# Patient Record
Sex: FEMALE | Race: WHITE | NOT HISPANIC OR LATINO | Employment: UNEMPLOYED | URBAN - METROPOLITAN AREA
[De-identification: names, ages, dates, MRNs, and addresses within clinical notes are randomized per-mention and may not be internally consistent; named-entity substitution may affect disease eponyms.]

---

## 2024-07-11 ENCOUNTER — OFFICE VISIT (OUTPATIENT)
Dept: PRIMARY CARE | Facility: CLINIC | Age: 44
End: 2024-07-11
Payer: COMMERCIAL

## 2024-07-11 ENCOUNTER — HOSPITAL ENCOUNTER (OUTPATIENT)
Dept: RADIOLOGY | Facility: CLINIC | Age: 44
Discharge: HOME | End: 2024-07-11
Payer: COMMERCIAL

## 2024-07-11 VITALS
HEART RATE: 103 BPM | DIASTOLIC BLOOD PRESSURE: 76 MMHG | TEMPERATURE: 98 F | WEIGHT: 185 LBS | RESPIRATION RATE: 18 BRPM | SYSTOLIC BLOOD PRESSURE: 122 MMHG | OXYGEN SATURATION: 98 %

## 2024-07-11 DIAGNOSIS — M25.551 ACUTE RIGHT HIP PAIN: ICD-10-CM

## 2024-07-11 DIAGNOSIS — M25.551 ACUTE RIGHT HIP PAIN: Primary | ICD-10-CM

## 2024-07-11 DIAGNOSIS — M62.838 MUSCLE SPASM: ICD-10-CM

## 2024-07-11 PROCEDURE — 73502 X-RAY EXAM HIP UNI 2-3 VIEWS: CPT | Mod: RT

## 2024-07-11 PROCEDURE — 99213 OFFICE O/P EST LOW 20 MIN: CPT | Performed by: NURSE PRACTITIONER

## 2024-07-11 PROCEDURE — 1036F TOBACCO NON-USER: CPT | Performed by: NURSE PRACTITIONER

## 2024-07-11 PROCEDURE — 73502 X-RAY EXAM HIP UNI 2-3 VIEWS: CPT | Mod: RIGHT SIDE | Performed by: RADIOLOGY

## 2024-07-11 RX ORDER — CYCLOBENZAPRINE HCL 10 MG
10 TABLET ORAL NIGHTLY PRN
Qty: 30 TABLET | Refills: 0 | Status: SHIPPED | OUTPATIENT
Start: 2024-07-11 | End: 2024-09-09

## 2024-07-11 RX ORDER — SERTRALINE HYDROCHLORIDE 100 MG/1
TABLET, FILM COATED ORAL
COMMUNITY
Start: 2021-09-03

## 2024-07-11 RX ORDER — LEVONORGESTREL 52 MG/1
1 INTRAUTERINE DEVICE INTRAUTERINE
COMMUNITY

## 2024-07-11 RX ORDER — PREDNISONE 20 MG/1
TABLET ORAL
Qty: 18 TABLET | Refills: 0 | Status: SHIPPED | OUTPATIENT
Start: 2024-07-11 | End: 2024-07-20

## 2024-07-11 RX ORDER — DEXTROAMPHETAMINE SACCHARATE, AMPHETAMINE ASPARTATE, DEXTROAMPHETAMINE SULFATE AND AMPHETAMINE SULFATE 5; 5; 5; 5 MG/1; MG/1; MG/1; MG/1
20 TABLET ORAL DAILY
COMMUNITY

## 2024-07-11 RX ORDER — ARIPIPRAZOLE 5 MG/1
TABLET ORAL
COMMUNITY

## 2024-07-11 ASSESSMENT — ENCOUNTER SYMPTOMS
FEVER: 0
HEADACHES: 0
LOSS OF SENSATION: 0
FATIGUE: 0
CHILLS: 0
TINGLING: 1
DIARRHEA: 0
CONSTIPATION: 0
ARTHRALGIAS: 1
SHORTNESS OF BREATH: 0
MYALGIAS: 1
NUMBNESS: 1
DYSURIA: 0
NERVOUS/ANXIOUS: 0
SORE THROAT: 0
HIP PAIN: 1
RHINORRHEA: 0
LOSS OF MOTION: 0

## 2024-07-11 NOTE — PROGRESS NOTES
Subjective   Patient ID: Deidra Caatlan is a 43 y.o. female who presents for Hip Pain.    She has a burning in the front of her thigh, and today when she woke up she had more pain in the back and side of her hip.    She has been taking ibuprofen 600mg twice a day which barely takes the edge off.     She had an MRI of her hips about 4 years ago, which was normal.    Hip Pain   Incident onset: 1 month. There was no injury mechanism. The pain is present in the right hip and right thigh. The quality of the pain is described as burning. Associated symptoms include numbness and tingling. Pertinent negatives include no loss of motion or loss of sensation. The symptoms are aggravated by movement, weight bearing and palpation. She has tried NSAIDs, non-weight bearing, rest and elevation for the symptoms. The treatment provided mild relief.        Review of Systems   Constitutional:  Negative for chills, fatigue and fever.   HENT:  Negative for rhinorrhea and sore throat.    Respiratory:  Negative for shortness of breath.    Cardiovascular:  Negative for chest pain.   Gastrointestinal:  Negative for constipation and diarrhea.   Genitourinary:  Negative for dysuria.   Musculoskeletal:  Positive for arthralgias and myalgias.   Neurological:  Positive for tingling and numbness. Negative for headaches.   Psychiatric/Behavioral:  The patient is not nervous/anxious.        Objective   /76   Pulse 103   Temp 36.7 °C (98 °F)   Resp 18   Wt 83.9 kg (185 lb)   SpO2 98%     Physical Exam  Constitutional:       Appearance: Normal appearance.   Cardiovascular:      Rate and Rhythm: Normal rate and regular rhythm.      Pulses: Normal pulses.      Heart sounds: Normal heart sounds.   Pulmonary:      Effort: Pulmonary effort is normal.      Breath sounds: Normal breath sounds.   Abdominal:      General: Bowel sounds are normal.      Palpations: Abdomen is soft.   Musculoskeletal:         General: Normal range of motion.       Right hip: Tenderness and bony tenderness present. Normal range of motion. Normal strength.      Left hip: Normal.        Legs:    Skin:     General: Skin is warm and dry.   Neurological:      General: No focal deficit present.      Mental Status: She is alert.   Psychiatric:         Mood and Affect: Mood normal.         Behavior: Behavior normal.         Assessment/Plan   Problem List Items Addressed This Visit    None  Visit Diagnoses       Acute right hip pain    -  Primary    Relevant Medications    predniSONE (Deltasone) 20 mg tablet    cyclobenzaprine (Flexeril) 10 mg tablet    Other Relevant Orders    XR hip right with pelvis when performed 2 or 3 views    Referral to Orthopaedic Surgery    Muscle spasm        Relevant Orders    Referral to Orthopaedic Surgery          Patient Instructions   Patient encouraged to have xray done today, and we will call with results when available. Start taking prednisone daily, flexeril and tylenol as needed. Encouraged to follow-up with ortho or PCP if no improvement by Monday. Call the office if any problems or concerns in the meantime.

## 2024-07-12 NOTE — PATIENT INSTRUCTIONS
Patient encouraged to have xray done today, and we will call with results when available. Start taking prednisone daily, flexeril and tylenol as needed. Encouraged to follow-up with ortho or PCP if no improvement by Monday. Call the office if any problems or concerns in the meantime.

## 2024-07-24 ENCOUNTER — OFFICE VISIT (OUTPATIENT)
Dept: ORTHOPEDIC SURGERY | Facility: CLINIC | Age: 44
End: 2024-07-24
Payer: COMMERCIAL

## 2024-07-24 DIAGNOSIS — M25.551 ACUTE RIGHT HIP PAIN: ICD-10-CM

## 2024-07-24 DIAGNOSIS — M32.9 LUPUS ARTHRITIS (MULTI): ICD-10-CM

## 2024-07-24 DIAGNOSIS — M25.859 FEMORAL ACETABULAR IMPINGEMENT: ICD-10-CM

## 2024-07-24 DIAGNOSIS — M62.838 MUSCLE SPASM: ICD-10-CM

## 2024-07-24 PROCEDURE — 99213 OFFICE O/P EST LOW 20 MIN: CPT | Performed by: STUDENT IN AN ORGANIZED HEALTH CARE EDUCATION/TRAINING PROGRAM

## 2024-07-24 PROCEDURE — 99203 OFFICE O/P NEW LOW 30 MIN: CPT | Performed by: STUDENT IN AN ORGANIZED HEALTH CARE EDUCATION/TRAINING PROGRAM

## 2024-07-24 RX ORDER — METHYLPREDNISOLONE 4 MG/1
TABLET ORAL
Qty: 21 TABLET | Refills: 0 | Status: SHIPPED | OUTPATIENT
Start: 2024-07-24

## 2024-07-24 NOTE — PROGRESS NOTES
No chief complaint on file.      HPI  43-year-old female presents today for evaluation of her right hip.  States that she has been having some right sharp hip pain with no recent history of trauma.  Also recently had a colitis type flare.  States that the pain is sharp worse with movement and relieved with rest.  States the pain begins about the anterior aspect of the hip and does radiate to the groin.  Denies any numbness or tingling to the lower extremity.  Currently 2 out of 10 on pain scale.    Past Medical History:   Diagnosis Date    Depression, unspecified 12/16/2019    Depression       No past surgical history on file.     No Known Allergies     Physical exam    General: Alert and oriented to place, person, and time.  No acute distress and breathing comfortably; pleasant and cooperative with the examination.  HEENT: Head is normocephalic and atraumatic.  Neck: Supple, no visible swelling.  Cardiovascular: Good perfusion to the affected extremity.  Lungs: No audible wheezing or labored breathing.  Abdomen: Nondistended  HEME/Lymph : No visible abnormalities bilateral lower extremity    Extremity:  Right Hip:  Normal gait  Negative Trendelenburg sign  Skin healthy and intact  No tenderness to palpation over lumbar spine  No tenderness over greater trochanter    Full forward flexion  Symmetric motion, no loss of internal rotation   No weakness with resisted hip flexion, abduction or adduction    Positive flexion/adduction/internal rotation  Negative flexion/abduction/external rotation test  Negative straight leg raise  Neurovascular exam normal distally     Diagnostics:    XR hip right with pelvis when performed 2 or 3 views    Result Date: 7/12/2024  Interpreted By:  Jesusita Colón, STUDY: Single view pelvis. Right hip, two views.   INDICATION: Signs/Symptoms:hip pain.   COMPARISON: None.   ACCESSION NUMBER(S): [id number]   ORDERING CLINICIAN: SUSU OLEARY   FINDINGS: No acute fracture or  malalignment. Bilateral hip joint spaces are well preserved.   Pubic symphysis degenerative changes with sclerosis and osteophytes. Soft tissues are within normal limits.       1. Unremarkable pelvis and right hip radiographs. 2. Degenerative changes of the pubic symphysis noted   MACRO: None.   Signed by: Jesusita Colón 7/12/2024 7:07 PM Dictation workstation:   [id number]       Procedure:  Procedures    Assessment:  43-year-old female with right hip pain concerning with femoral acetabular impingement    Treatment plan:  The natural history of the condition and its associated treatment alternatives including surgical and nonsurgical options were discussed with the patient at length.  Constellation of findings discussed with patient in detail x-rays regarding the pelvis reviewed this is consistent with osteitis pubis, on history and physical exam patient does have some groin pain that may likely be secondary to labrum tear/femoral acetabular impingement I do think she would benefit from an oral Medrol with Dosepak as well as physical therapy.  I do think that she would also benefit from a rheumatology workup for possible lupus versus other rheumatologic etiology given her recent colitis flare and new onset joint pain.  Will provide a prescription for oral Medrol Dosepak  She will follow-up in 2 months  Discussed activities to avoid as well as importance of using pain as a guide  All of the patient's questions were answered.

## 2024-07-26 ENCOUNTER — TELEPHONE (OUTPATIENT)
Dept: ORTHOPEDIC SURGERY | Facility: CLINIC | Age: 44
End: 2024-07-26

## 2024-07-26 ENCOUNTER — OFFICE VISIT (OUTPATIENT)
Dept: RHEUMATOLOGY | Facility: CLINIC | Age: 44
End: 2024-07-26
Payer: COMMERCIAL

## 2024-07-26 VITALS
HEIGHT: 65 IN | WEIGHT: 175 LBS | HEART RATE: 123 BPM | DIASTOLIC BLOOD PRESSURE: 70 MMHG | TEMPERATURE: 97.8 F | BODY MASS INDEX: 29.16 KG/M2 | SYSTOLIC BLOOD PRESSURE: 103 MMHG

## 2024-07-26 DIAGNOSIS — K52.9 COLITIS: Primary | ICD-10-CM

## 2024-07-26 DIAGNOSIS — M25.859 FEMORAL ACETABULAR IMPINGEMENT: ICD-10-CM

## 2024-07-26 PROCEDURE — 3008F BODY MASS INDEX DOCD: CPT | Performed by: INTERNAL MEDICINE

## 2024-07-26 PROCEDURE — 1036F TOBACCO NON-USER: CPT | Performed by: INTERNAL MEDICINE

## 2024-07-26 PROCEDURE — 99214 OFFICE O/P EST MOD 30 MIN: CPT | Performed by: INTERNAL MEDICINE

## 2024-07-26 RX ORDER — CELECOXIB 200 MG/1
200 CAPSULE ORAL 2 TIMES DAILY PRN
Qty: 60 CAPSULE | Refills: 0 | Status: SHIPPED | OUTPATIENT
Start: 2024-07-26 | End: 2024-08-25

## 2024-07-26 NOTE — PATIENT INSTRUCTIONS
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    Follow up with GI for colitis,  If there is future concerns about IBD related arthritis we will be seeing you then

## 2024-07-26 NOTE — TELEPHONE ENCOUNTER
Patient called stating she was seen by Dr. Luna yesterday, 07/24/24 & that he recommended she see a rheumatologist for possible lupus, which she did see one today & the rheumatologist told her she does not have lupus, but that she has some bone spurs and osteoarthritis in both hips. She would like to know If he would like her to do physical therapy or what does he advise her to do. She states her next appointment to see Dr. Luna isn't until September so she is just curious what she should be doing in the meantime to help.

## 2024-07-26 NOTE — TELEPHONE ENCOUNTER
Spoke with Dr. Luna, he said that he sent over an MDP, to take that, see if it helps with the pain and thy will discuss everything at the next visit.    Called patient, she said that the rheum said that she should stop the MDP and start taking Celebrex.  I told her to keep taking that and then everything will be discussed at her next visit.  Verbalized understanding

## 2024-07-26 NOTE — PROGRESS NOTES
Subjective   Patient ID: [id number]   Deidra Catalan is a 43 y.o. female who presents for Joint Pain.  HPI  New referral for right Hip pain  Seen by ortho recently, advised to rule out ?lupus  Patient has chronic hip pain but has been getting worse for a few weeks  Recently had sudden worsening with bloody diarrhea prompting her to go to ED  CT was performed that showed active inflammatory colitis in the descending colon  Infectious vs inflammatory  For hip pain she was seen by ortho and told she may have autoimmune inflammatory arthritis or lupus?    She has mechanical hip pain bilaterally, but more on the right now, worse with walking, climbing stairs, standing from a sitting position , getting out of a car etc  No clicking or popping sounds  No pain at rest  Denies fever  Denies achilles tendonitis, plantar fascitis, dactylitis  Denies history of chronic back pain, morning stiffness  NO psoriasis  Other joints are fine    Prior history of fatigue and alopecia  With alopecia areata  CTD autoabs were tested then , she was positive for AZALEA low titre  Which was subsequently negative in 2021 along with negative CHERYL   Denies malar rash, RP, oral or genital ulcers,   Denies fevers, weight loss, night sweats  No obstetric hx, no prior VTE phenomenon  No myalgias    Has been having chronic diarrhea intermittently wo blood  But from las week had bloody diarrhea as aforementioned  Non smoker  Has 5 children    ROS  Constitutional: Denies fever, chills, weight loss, night sweats or headaches  Eyes: Denies dry eyes, blurry vision, redness or pain or photophobia  ENT: Denies dry mouth, dental loss, loss of taste, nasal or oral ulcers, jaw claudication, difficulty swallowing, nasal crusting or recurrent sinus infections   Cardiovascular: Denies chest pain, palpitations, orthopnea  Respiratory: Denies shortness of breath, cough, asthma, or recurrent respiratory infections  Gastrointestinal: Denies dysphagia, nausea, vomiting,  heartburn, abdominal pain, constipation, diarrhea, melena or hematochezia  Genitourinary: No recurrent urinary infections or STDs, no genital or anal ulcers.  Integumentary: Denies photosensitivity, rash or lesions, Raynaud's phenomenon, skin tightening, digital ulcers, psoriatic lesions, or alopecia  Neurological: Denies any numbness or tingling, muscle weakness, or incontinence   Hematologic/Lymphatic: Denies bleeding, bruising, history of clots (arterial or venous), or abortions/miscarriages/pregnancy complications   MSK: No joint pains, redness, hotness or swelling. No inflammatory back pain, enthesitis, dactylitis. No morning stiffness   Muscular: Denies weakness, difficulty rising from chair or combing the hair, muscle aches, or problems with hand    FHx: No family history of autoimmune diseases       There is no problem list on file for this patient.       Past Medical History:   Diagnosis Date    Depression, unspecified 12/16/2019    Depression        No past surgical history on file.     Social History     Socioeconomic History    Marital status:      Spouse name: Not on file    Number of children: Not on file    Years of education: Not on file    Highest education level: Not on file   Occupational History    Not on file   Tobacco Use    Smoking status: Former     Types: Cigarettes     Passive exposure: Past    Smokeless tobacco: Never   Substance and Sexual Activity    Alcohol use: Not on file    Drug use: Not on file    Sexual activity: Not on file   Other Topics Concern    Not on file   Social History Narrative    Not on file     Social Determinants of Health     Financial Resource Strain: Not on file   Food Insecurity: Not on file   Transportation Needs: Not on file   Physical Activity: Not on file   Stress: Not on file   Social Connections: Not on file   Intimate Partner Violence: Not on file   Housing Stability: Not on file        No Known Allergies       Current Outpatient Medications:      amphetamine-dextroamphetamine (Adderall) 20 mg tablet, Take 1 tablet (20 mg) by mouth once daily., Disp: , Rfl:     ARIPiprazole (Abilify) 5 mg tablet, Take by mouth., Disp: , Rfl:     celecoxib (CeleBREX) 200 mg capsule, Take 1 capsule (200 mg) by mouth 2 times a day as needed for moderate pain (4 - 6)., Disp: 60 capsule, Rfl: 0    cyclobenzaprine (Flexeril) 10 mg tablet, Take 1 tablet (10 mg) by mouth as needed at bedtime for muscle spasms., Disp: 30 tablet, Rfl: 0    levonorgestrel (Mirena) 21 mcg/24 hr (8 yrs) 52 mg IUD, 52 mg by intrauterine route., Disp: , Rfl:     methylPREDNISolone (Medrol Dospak) 4 mg tablets, Use as directed by package instructions, Disp: 21 tablet, Rfl: 0    sertraline (Zoloft) 100 mg tablet, Take by mouth., Disp: , Rfl:        Objective     Visit Vitals  /70 (BP Location: Left arm, Patient Position: Sitting, BP Cuff Size: Adult)   Pulse (!) 123   Temp 36.6 °C (97.8 °F)        Physical Exam  FADIR positive bilaterally  R>L  No stigmata of rheumatic disease on other joint exam  No enthesitis, plantar fascitis or dactylitis  No Psoriasis rash seen    General: AAOx3, Cooperative  Head: normocephalic, atraumatic  Eyes: EOMI, conjunctiva clear, sclera white, anicteric  Ears: no redness, swelling, tenderness  Nose: no deformity, no crusting   Throat/Mouth: No oral deformities, no cheek swelling, mucosa appear moist, no oral ulcers noted or loss of dentition   Neck/Lymph: FROM, trachea midline  Lungs: chest expansion symmetric. No respiratory distress.   Heart: RRR  Neuro: CN II-XII grossly intact, no focal deficit  Skin: No rashes, ulcers or photosensitive areas  MSK: Upper Extremities:  Hand/Fingers: No erythema, swelling, tenderness or warmth at DIP, PIP, or MCP joints, FROM grossly. Good hand . No nodules. No deformities   Wrists: No erythema, swelling, warmth or tenderness at wrist, FROM grossly  Elbows: No tenderness, swelling, erythema or warmth at elbows, FROM grossly. No  "nodules   Shoulders: No swelling, erythema, tenderness or warmth at shoulders. FROM  Lower Extremities:   Hips: No obvious deformities. No joint tenderness, normal ROM grossly. Log roll test negative bilaterally. Tylor test is negative bilaterally. No trochanteric bursae TTP  Knees: No tenderness, deformities, swelling, rashes, or warmth, normal ROM grossly. No crepitus, no pes anserine bursa TTP   Ankles: No deformities, tenderness, edema, erythema, ulceration, or warmth at the ankle  Feet: Negative MTP squeeze. Normal ROM grossly.   Spine: No spinal tenderness to palpation. No SI joint tenderness. Gaenslen test negative       [unfilled]      Lab Results   Component Value Date    WBC 9.7 09/04/2021    HGB 14.6 09/04/2021    HCT 44.0 09/04/2021    MCV 94 09/04/2021     09/04/2021        Chemistry    Lab Results   Component Value Date/Time     09/04/2021 1556    K 3.9 09/04/2021 1556     09/04/2021 1556    CO2 27 09/04/2021 1556    BUN 12 09/04/2021 1556    CREATININE 0.77 09/04/2021 1556    Lab Results   Component Value Date/Time    CALCIUM 9.2 09/04/2021 1556    ALKPHOS 59 09/04/2021 1556    AST 14 09/04/2021 1556    ALT 11 09/04/2021 1556    BILITOT 0.5 09/04/2021 1556           No results found for: \"CRP\"   No results found for: \"AZALEA\", \"RF\", \"SEDRATE\"   No results found for: \"CKTOTAL\"  Lab Results   Component Value Date    NEUTROABS 6.50 09/04/2021      No results found for: \"FERRITIN\"   No results found for: \"HEPATOT\", \"HEPAIGM\", \"HEPBCIGM\", \"HEPBCAB\", \"HBEAG\", \"HEPCAB\"   Lab Results   Component Value Date    ALT 11 09/04/2021    AST 14 09/04/2021    ALKPHOS 59 09/04/2021    BILITOT 0.5 09/04/2021      No results found for: \"PPD\"   No results found for: \"URICACID\"   Lab Results   Component Value Date    CALCIUM 9.2 09/04/2021      No results found for: \"SPEP\", \"UPEP\"   No results found for: \"ALBUR\", \"[id number]\"   .last          XR hip right with pelvis when performed 2 or 3 " views  Narrative: Interpreted By:  Jesusita Colón,   STUDY:  Single view pelvis.  Right hip, two views.      INDICATION:  Signs/Symptoms:hip pain.      COMPARISON:  None.      ACCESSION NUMBER(S):  [id number]      ORDERING CLINICIAN:  SUSU OLEARY      FINDINGS:  No acute fracture or malalignment.  Bilateral hip joint spaces are well preserved.      Pubic symphysis degenerative changes with sclerosis and osteophytes.  Soft tissues are within normal limits.      Impression: 1. Unremarkable pelvis and right hip radiographs.  2. Degenerative changes of the pubic symphysis noted      MACRO:  None.      Signed by: Jesusita Colón 7/12/2024 7:07 PM  Dictation workstation:   [id number]     === 07/11/24 ===    XR HIP RIGHT WITH PELVIS WHEN PERFORMED 2 OR 3 VIEWS    - Impression -  1. Unremarkable pelvis and right hip radiographs.  2. Degenerative changes of the pubic symphysis noted    MACRO:  None.    Signed by: Jesusita Colón 7/12/2024 7:07 PM  Dictation workstation:   [id number]             Assessment/Plan   Diagnoses and all orders for this visit:  Femoral acetabular impingement  Xrays reviewed  Has quite the typical pincer type lesion of the Left HIP with JSN and supra lateral sclerosis of the acetabulum, right hip also has pincer type deformity, not as pronounced but has JSN as well ( although report mentions it as normal)  Also pain is reproduced with hip flexion internal rotation and adduction consistent with WILLI and early OA from WILLI + possibly some labral injury as a consequence  Pain is not present at rest and points away from an inflammatory arthritis  Also The diagnosis of IBD is not established, I have clinically low concern for IBD arthritis at this time    Advised to see ortho for definitive treatment of WILLI and consideration of an MRI hip if needed    Advised to use tylenol and can use Celebrex for pain that does not have interaction with IBD type of inflammation  Stop MEDROL pack  On antibiotics  for her acute colitis  Follow up with GI   RTC If future concerns for IBD arthritis,    Patient does not have Lupus, and that diagnosis will be removed from her chart to avoid confusion in the future.    -     celecoxib (CeleBREX) 200 mg capsule; Take 1 capsule (200 mg) by mouth 2 times a day as needed for moderate pain (4 - 6).           Alexandre Naik MD    [facility]  Department of Rheumatology    Plan, including risks and benefits, was discussed with the patient, informed on how to reach us.     To schedule an appointment, call  [phone] [place] or call [phone] for [facility]

## 2024-09-18 ENCOUNTER — APPOINTMENT (OUTPATIENT)
Dept: GASTROENTEROLOGY | Facility: CLINIC | Age: 44
End: 2024-09-18
Payer: COMMERCIAL

## 2024-09-25 ENCOUNTER — APPOINTMENT (OUTPATIENT)
Dept: ORTHOPEDIC SURGERY | Facility: CLINIC | Age: 44
End: 2024-09-25
Payer: COMMERCIAL